# Patient Record
Sex: MALE
[De-identification: names, ages, dates, MRNs, and addresses within clinical notes are randomized per-mention and may not be internally consistent; named-entity substitution may affect disease eponyms.]

---

## 2019-01-09 ENCOUNTER — HOSPITAL ENCOUNTER (OUTPATIENT)
Dept: HOSPITAL 90 - SHCH | Age: 74
Discharge: HOME | End: 2019-01-09
Attending: INTERNAL MEDICINE
Payer: OTHER GOVERNMENT

## 2019-01-09 DIAGNOSIS — I08.1: Primary | ICD-10-CM

## 2019-01-09 PROCEDURE — 93306 TTE W/DOPPLER COMPLETE: CPT

## 2019-01-10 ENCOUNTER — HOSPITAL ENCOUNTER (OUTPATIENT)
Dept: HOSPITAL 90 - SHCH | Age: 74
Discharge: HOME | End: 2019-01-10
Attending: INTERNAL MEDICINE
Payer: OTHER GOVERNMENT

## 2019-01-10 DIAGNOSIS — I65.23: Primary | ICD-10-CM

## 2019-01-10 DIAGNOSIS — I73.9: ICD-10-CM

## 2019-01-10 PROCEDURE — 93880 EXTRACRANIAL BILAT STUDY: CPT

## 2019-01-10 PROCEDURE — 93925 LOWER EXTREMITY STUDY: CPT

## 2019-01-15 ENCOUNTER — HOSPITAL ENCOUNTER (OUTPATIENT)
Dept: HOSPITAL 90 - SHCH | Age: 74
Discharge: HOME | End: 2019-01-15
Attending: INTERNAL MEDICINE
Payer: OTHER GOVERNMENT

## 2019-01-15 DIAGNOSIS — I10: Primary | ICD-10-CM

## 2019-01-15 DIAGNOSIS — I25.10: ICD-10-CM

## 2019-01-15 PROCEDURE — 96374 THER/PROPH/DIAG INJ IV PUSH: CPT

## 2019-01-15 PROCEDURE — 78452 HT MUSCLE IMAGE SPECT MULT: CPT

## 2019-01-15 PROCEDURE — 93017 CV STRESS TEST TRACING ONLY: CPT

## 2019-02-22 VITALS — SYSTOLIC BLOOD PRESSURE: 128 MMHG | DIASTOLIC BLOOD PRESSURE: 67 MMHG

## 2019-02-22 LAB
APPEARANCE UR: CLEAR
APTT PPP: 29.1 SEC (ref 26.3–35.5)
BASOPHILS NFR BLD AUTO: 0.6 % (ref 0–5)
BUN SERPL-MCNC: 23 MG/DL (ref 7–18)
CHLORIDE SERPL-SCNC: 103 MMOL/L (ref 101–111)
CO2 SERPL-SCNC: 28 MMOL/L (ref 21–32)
CREAT SERPL-MCNC: 1.3 MG/DL (ref 0.5–1.5)
EOSINOPHIL NFR BLD AUTO: 10.4 % (ref 0–8)
ERYTHROCYTE [DISTWIDTH] IN BLOOD BY AUTOMATED COUNT: 13.3 % (ref 11–15.5)
GFR SERPL CREATININE-BSD FRML MDRD: 58 ML/MIN (ref 60–?)
GLUCOSE SERPL-MCNC: 173 MG/DL (ref 70–105)
HCT VFR BLD AUTO: 37.7 % (ref 42–54)
INR PPP: 0.96 (ref 0.85–1.15)
LYMPHOCYTES NFR SPEC AUTO: 31.2 % (ref 21–51)
MCH RBC QN AUTO: 30.7 PG (ref 27–33)
MCHC RBC AUTO-ENTMCNC: 33.4 G/DL (ref 32–36)
MCV RBC AUTO: 91.8 FL (ref 79–99)
MONOCYTES NFR BLD AUTO: 8.6 % (ref 3–13)
NEUTROPHILS NFR BLD AUTO: 49.2 % (ref 40–77)
NRBC BLD MANUAL-RTO: 0 % (ref 0–0.19)
PH UR STRIP: 5.5 [PH] (ref 5–8)
PLATELET # BLD AUTO: 136 K/UL (ref 130–400)
POTASSIUM SERPL-SCNC: 4.5 MMOL/L (ref 3.5–5.1)
PROTHROMBIN TIME: 10.1 SEC (ref 9.6–11.6)
RBC # BLD AUTO: 4.11 MIL/UL (ref 4.5–6.2)
RBC #/AREA URNS HPF: (no result) /HPF (ref 0–1)
SODIUM SERPL-SCNC: 140 MMOL/L (ref 136–145)
SP GR UR STRIP: 1.02 (ref 1–1.03)
UROBILINOGEN UR STRIP-MCNC: 1 MG/DL (ref 0.2–1)
WBC # BLD AUTO: 5.5 K/UL (ref 4.8–10.8)
WBC #/AREA URNS HPF: (no result) /HPF (ref 0–1)

## 2019-02-25 NOTE — NUR
ABNORMAL LABS 

REPORTED ABNORMAL LABS UA, CREATINE 1.3, BUN 23 TO REGINA MALDONADO FOR DR. RIOS. NO NEW ORDERS.

## 2019-02-26 ENCOUNTER — HOSPITAL ENCOUNTER (OUTPATIENT)
Dept: HOSPITAL 90 - DAH | Age: 74
End: 2019-02-26
Attending: INTERNAL MEDICINE
Payer: OTHER GOVERNMENT

## 2019-02-26 VITALS — SYSTOLIC BLOOD PRESSURE: 145 MMHG | DIASTOLIC BLOOD PRESSURE: 75 MMHG

## 2019-02-26 VITALS — BODY MASS INDEX: 27.42 KG/M2 | HEIGHT: 66 IN | WEIGHT: 170.6 LBS

## 2019-02-26 VITALS — DIASTOLIC BLOOD PRESSURE: 74 MMHG | SYSTOLIC BLOOD PRESSURE: 144 MMHG

## 2019-02-26 VITALS — DIASTOLIC BLOOD PRESSURE: 66 MMHG | SYSTOLIC BLOOD PRESSURE: 138 MMHG

## 2019-02-26 VITALS — DIASTOLIC BLOOD PRESSURE: 74 MMHG | SYSTOLIC BLOOD PRESSURE: 148 MMHG

## 2019-02-26 VITALS — SYSTOLIC BLOOD PRESSURE: 141 MMHG | DIASTOLIC BLOOD PRESSURE: 69 MMHG

## 2019-02-26 VITALS — DIASTOLIC BLOOD PRESSURE: 71 MMHG | SYSTOLIC BLOOD PRESSURE: 147 MMHG

## 2019-02-26 VITALS — SYSTOLIC BLOOD PRESSURE: 144 MMHG | DIASTOLIC BLOOD PRESSURE: 66 MMHG

## 2019-02-26 VITALS — DIASTOLIC BLOOD PRESSURE: 71 MMHG | SYSTOLIC BLOOD PRESSURE: 135 MMHG

## 2019-02-26 VITALS — DIASTOLIC BLOOD PRESSURE: 72 MMHG | SYSTOLIC BLOOD PRESSURE: 129 MMHG

## 2019-02-26 VITALS — SYSTOLIC BLOOD PRESSURE: 146 MMHG | DIASTOLIC BLOOD PRESSURE: 75 MMHG

## 2019-02-26 VITALS — SYSTOLIC BLOOD PRESSURE: 143 MMHG | DIASTOLIC BLOOD PRESSURE: 75 MMHG

## 2019-02-26 VITALS — SYSTOLIC BLOOD PRESSURE: 145 MMHG | DIASTOLIC BLOOD PRESSURE: 67 MMHG

## 2019-02-26 DIAGNOSIS — E78.5: ICD-10-CM

## 2019-02-26 DIAGNOSIS — M19.90: ICD-10-CM

## 2019-02-26 DIAGNOSIS — Z79.01: ICD-10-CM

## 2019-02-26 DIAGNOSIS — Z88.8: ICD-10-CM

## 2019-02-26 DIAGNOSIS — Z98.890: ICD-10-CM

## 2019-02-26 DIAGNOSIS — I11.0: ICD-10-CM

## 2019-02-26 DIAGNOSIS — E11.9: ICD-10-CM

## 2019-02-26 DIAGNOSIS — I25.810: Primary | ICD-10-CM

## 2019-02-26 DIAGNOSIS — Z83.3: ICD-10-CM

## 2019-02-26 DIAGNOSIS — Z79.899: ICD-10-CM

## 2019-02-26 DIAGNOSIS — I50.32: ICD-10-CM

## 2019-02-26 DIAGNOSIS — Z79.84: ICD-10-CM

## 2019-02-26 PROCEDURE — 99156 MOD SED OTH PHYS/QHP 5/>YRS: CPT

## 2019-02-26 PROCEDURE — 85730 THROMBOPLASTIN TIME PARTIAL: CPT

## 2019-02-26 PROCEDURE — 71045 X-RAY EXAM CHEST 1 VIEW: CPT

## 2019-02-26 PROCEDURE — 82948 REAGENT STRIP/BLOOD GLUCOSE: CPT

## 2019-02-26 PROCEDURE — 80048 BASIC METABOLIC PNL TOTAL CA: CPT

## 2019-02-26 PROCEDURE — 81001 URINALYSIS AUTO W/SCOPE: CPT

## 2019-02-26 PROCEDURE — 99157 MOD SED OTHER PHYS/QHP EA: CPT

## 2019-02-26 PROCEDURE — 36415 COLL VENOUS BLD VENIPUNCTURE: CPT

## 2019-02-26 PROCEDURE — 93459 L HRT ART/GRFT ANGIO: CPT

## 2019-02-26 PROCEDURE — 85610 PROTHROMBIN TIME: CPT

## 2019-02-26 PROCEDURE — 85025 COMPLETE CBC W/AUTO DIFF WBC: CPT

## 2019-02-26 PROCEDURE — 93005 ELECTROCARDIOGRAM TRACING: CPT

## 2019-02-26 NOTE — NUR
INSTRUCTED SPOUSE AND PATIENT ON HOW TO CHECK FOR BLEEDING TO RT GROIN ,RETURNS 
DEMONSTRATION ,PATIENT AND SPOUSE VERBALIZE UNDERSTANDING,

## 2019-02-26 NOTE — NUR
AMBULATES TO BR ,STEADY GAIT,VOIODS WITH NO PROBLEM, STATES FEELS FINE ,BACK TO ROOM TO GET 
DRESSED TO GO HOME

## 2019-02-26 NOTE — NUR
RECEIVED AWAKE ALERT ,NO COMPLAINTS ,ACCOMPANIED BY WIFE ,PATIENT HARD OF HEARING,,ALERT 
,COHERENT,,,MADE COMFORTABL,HX OF PTSD,AND IS ON MEDICATIONM,CALL BELL IN REACH

## 2024-08-26 ENCOUNTER — HOSPITAL ENCOUNTER (OUTPATIENT)
Dept: HOSPITAL 90 - SHCH | Age: 79
Discharge: HOME | End: 2024-08-26
Attending: INTERNAL MEDICINE
Payer: OTHER GOVERNMENT

## 2024-08-26 DIAGNOSIS — I25.10: Primary | ICD-10-CM

## 2024-08-26 PROCEDURE — 93017 CV STRESS TEST TRACING ONLY: CPT

## 2024-08-26 PROCEDURE — 78452 HT MUSCLE IMAGE SPECT MULT: CPT

## 2024-08-26 RX ADMIN — REGADENOSON ONE MG: 0.08 INJECTION, SOLUTION INTRAVENOUS at 10:03
